# Patient Record
Sex: MALE | Race: WHITE | Employment: UNEMPLOYED | URBAN - METROPOLITAN AREA
[De-identification: names, ages, dates, MRNs, and addresses within clinical notes are randomized per-mention and may not be internally consistent; named-entity substitution may affect disease eponyms.]

---

## 2023-08-30 ENCOUNTER — HOSPITAL ENCOUNTER (OUTPATIENT)
Dept: CT IMAGING | Age: 53
Discharge: HOME | End: 2023-08-30
Attending: INTERNAL MEDICINE
Payer: COMMERCIAL

## 2023-08-30 DIAGNOSIS — R63.4 WEIGHT LOSS: ICD-10-CM

## 2023-08-30 PROCEDURE — 6360000004 HC RX CONTRAST MEDICATION: Performed by: INTERNAL MEDICINE

## 2023-08-30 PROCEDURE — 74177 CT ABD & PELVIS W/CONTRAST: CPT

## 2023-08-30 RX ADMIN — IOPAMIDOL 75 ML: 755 INJECTION, SOLUTION INTRAVENOUS at 15:22

## 2023-08-30 RX ADMIN — IOPAMIDOL 50 ML: 612 INJECTION, SOLUTION INTRAVENOUS at 15:21

## 2024-10-06 ENCOUNTER — HOSPITAL ENCOUNTER (EMERGENCY)
Age: 54
Discharge: HOME | End: 2024-10-06
Payer: COMMERCIAL

## 2024-10-06 ENCOUNTER — APPOINTMENT (OUTPATIENT)
Dept: GENERAL RADIOLOGY | Age: 54
End: 2024-10-06
Payer: COMMERCIAL

## 2024-10-06 VITALS
OXYGEN SATURATION: 100 % | BODY MASS INDEX: 19.24 KG/M2 | SYSTOLIC BLOOD PRESSURE: 160 MMHG | WEIGHT: 119.7 LBS | DIASTOLIC BLOOD PRESSURE: 87 MMHG | HEART RATE: 70 BPM | HEIGHT: 66 IN | RESPIRATION RATE: 20 BRPM | TEMPERATURE: 98.6 F

## 2024-10-06 DIAGNOSIS — S90.129A CONTUSION OF TOE WITHOUT DAMAGE TO NAIL, UNSPECIFIED LATERALITY, UNSPECIFIED TOE, INITIAL ENCOUNTER: ICD-10-CM

## 2024-10-06 DIAGNOSIS — S90.32XA CONTUSION OF LEFT FOOT, INITIAL ENCOUNTER: Primary | ICD-10-CM

## 2024-10-06 PROCEDURE — 99283 EMERGENCY DEPT VISIT LOW MDM: CPT

## 2024-10-06 PROCEDURE — 73630 X-RAY EXAM OF FOOT: CPT

## 2024-10-06 RX ORDER — NAPROXEN 500 MG/1
500 TABLET ORAL 2 TIMES DAILY
Qty: 20 TABLET | Refills: 0 | Status: SHIPPED | OUTPATIENT
Start: 2024-10-06 | End: 2024-10-16

## 2024-10-06 ASSESSMENT — PAIN - FUNCTIONAL ASSESSMENT: PAIN_FUNCTIONAL_ASSESSMENT: 0-10

## 2024-10-06 ASSESSMENT — PAIN SCALES - GENERAL: PAINLEVEL_OUTOF10: 10

## 2024-10-06 NOTE — ED PROVIDER NOTES
[facility] EMERGENCY DEPARTMENT  EMERGENCY DEPARTMENT ENCOUNTER        Pt Name: Claudio Avery  MRN: [id number]  Birthdate 1970  Date of evaluation: 10/6/2024  Provider: Dale Wood PA-C  PCP: Jadyn Eugene APRN - CNP  Note Started: 3:49 PM EDT 10/6/24      REAL. I have evaluated this patient.        CHIEF COMPLAINT       Chief Complaint   Patient presents with    Foot Injury     Pt c/o dropping something on his L foot at work on Friday. Pt big toe on left foot swollen and bruised. States he has taken ibuprofen with no relief.        HISTORY OF PRESENT ILLNESS: 1 or more Elements     History from : Patient    Limitations to history : None    Claudio Avery is a 54 y.o. male who presents to the emergency department complaining of left foot and toe pain status post blunt trauma which occurred at work on Friday.  He was wearing tennis shoes and accidentally ran his left foot and toes into a steel object.  He has been able to bear weight and ambulate since then but still has persistent pain despite going to urgent care on Friday and receiving 800 mg ibuprofen.  He would like x-rays of the foot and toe.  They were unable to obtain x-rays at the urgent care on Friday.    Nursing Notes were all reviewed and agreed with or any disagreements were addressed in the HPI.    REVIEW OF SYSTEMS :      Review of Systems   Constitutional:  Negative for chills and fever.   Respiratory:  Negative for shortness of breath.    Cardiovascular:  Negative for chest pain and leg swelling.   Musculoskeletal:  Positive for myalgias.   Skin:  Negative for color change, pallor, rash and wound.   Neurological:  Negative for weakness and numbness.   All other systems reviewed and are negative.      Positives and Pertinent negatives as per HPI.     SURGICAL HISTORY   History reviewed. No pertinent surgical history.    CURRENTMEDICATIONS       Discharge Medication List as of 10/6/2024  5:21 PM